# Patient Record
Sex: FEMALE | Race: OTHER | HISPANIC OR LATINO | URBAN - METROPOLITAN AREA
[De-identification: names, ages, dates, MRNs, and addresses within clinical notes are randomized per-mention and may not be internally consistent; named-entity substitution may affect disease eponyms.]

---

## 2019-02-09 ENCOUNTER — INPATIENT (INPATIENT)
Facility: HOSPITAL | Age: 34
LOS: 3 days | Discharge: HOME | End: 2019-02-13
Attending: OBSTETRICS & GYNECOLOGY | Admitting: OBSTETRICS & GYNECOLOGY
Payer: COMMERCIAL

## 2019-02-09 VITALS — WEIGHT: 177.91 LBS | HEIGHT: 60 IN

## 2019-02-09 DIAGNOSIS — O26.899 OTHER SPECIFIED PREGNANCY RELATED CONDITIONS, UNSPECIFIED TRIMESTER: ICD-10-CM

## 2019-02-09 DIAGNOSIS — Z3A.00 WEEKS OF GESTATION OF PREGNANCY NOT SPECIFIED: ICD-10-CM

## 2019-02-09 LAB
ALBUMIN SERPL ELPH-MCNC: 3.6 G/DL — SIGNIFICANT CHANGE UP (ref 3.3–5)
ALP SERPL-CCNC: 119 U/L — SIGNIFICANT CHANGE UP (ref 40–120)
ALT FLD-CCNC: 16 U/L — SIGNIFICANT CHANGE UP (ref 10–45)
ANION GAP SERPL CALC-SCNC: 14 MMOL/L — SIGNIFICANT CHANGE UP (ref 5–17)
APPEARANCE UR: CLEAR — SIGNIFICANT CHANGE UP
APTT BLD: 27.9 SEC — SIGNIFICANT CHANGE UP (ref 27.5–36.3)
AST SERPL-CCNC: 20 U/L — SIGNIFICANT CHANGE UP (ref 10–40)
BILIRUB DIRECT SERPL-MCNC: <0.2 MG/DL — SIGNIFICANT CHANGE UP (ref 0–0.2)
BILIRUB INDIRECT FLD-MCNC: >0 MG/DL — LOW (ref 0.2–1)
BILIRUB SERPL-MCNC: 0.2 MG/DL — SIGNIFICANT CHANGE UP (ref 0.2–1.2)
BILIRUB UR-MCNC: NEGATIVE — SIGNIFICANT CHANGE UP
BUN SERPL-MCNC: 4 MG/DL — LOW (ref 7–23)
CALCIUM SERPL-MCNC: 9.6 MG/DL — SIGNIFICANT CHANGE UP (ref 8.4–10.5)
CHLORIDE SERPL-SCNC: 102 MMOL/L — SIGNIFICANT CHANGE UP (ref 96–108)
CO2 SERPL-SCNC: 21 MMOL/L — LOW (ref 22–31)
COLOR SPEC: YELLOW — SIGNIFICANT CHANGE UP
CREAT SERPL-MCNC: 0.4 MG/DL — LOW (ref 0.5–1.3)
DIFF PNL FLD: ABNORMAL
FIBRINOGEN PPP-MCNC: 563 MG/DL — HIGH (ref 258–438)
FIBRONECTIN FETAL SPEC QL: POSITIVE
GLUCOSE SERPL-MCNC: 71 MG/DL — SIGNIFICANT CHANGE UP (ref 70–99)
GLUCOSE UR QL: NEGATIVE — SIGNIFICANT CHANGE UP
HBV SURFACE AG SER-ACNC: SIGNIFICANT CHANGE UP
HCT VFR BLD CALC: 38.1 % — SIGNIFICANT CHANGE UP (ref 34.5–45)
HGB BLD-MCNC: 12.2 G/DL — SIGNIFICANT CHANGE UP (ref 11.5–15.5)
HIV 1+2 AB+HIV1 P24 AG SERPL QL IA: SIGNIFICANT CHANGE UP
INR BLD: 0.98 — SIGNIFICANT CHANGE UP (ref 0.88–1.16)
KETONES UR-MCNC: NEGATIVE — SIGNIFICANT CHANGE UP
LEUKOCYTE ESTERASE UR-ACNC: NEGATIVE — SIGNIFICANT CHANGE UP
MCHC RBC-ENTMCNC: 29.4 PG — SIGNIFICANT CHANGE UP (ref 27–34)
MCHC RBC-ENTMCNC: 32 GM/DL — SIGNIFICANT CHANGE UP (ref 32–36)
MCV RBC AUTO: 91.8 FL — SIGNIFICANT CHANGE UP (ref 80–100)
NITRITE UR-MCNC: NEGATIVE — SIGNIFICANT CHANGE UP
NRBC # BLD: 0 /100 WBCS — SIGNIFICANT CHANGE UP (ref 0–0)
PH UR: 7 — SIGNIFICANT CHANGE UP (ref 5–8)
PLATELET # BLD AUTO: 352 K/UL — SIGNIFICANT CHANGE UP (ref 150–400)
POTASSIUM SERPL-MCNC: 3.4 MMOL/L — LOW (ref 3.5–5.3)
POTASSIUM SERPL-SCNC: 3.4 MMOL/L — LOW (ref 3.5–5.3)
PROT SERPL-MCNC: 7.2 G/DL — SIGNIFICANT CHANGE UP (ref 6–8.3)
PROT UR-MCNC: NEGATIVE MG/DL — SIGNIFICANT CHANGE UP
PROTHROM AB SERPL-ACNC: 11.1 SEC — SIGNIFICANT CHANGE UP (ref 10–12.9)
RBC # BLD: 4.15 M/UL — SIGNIFICANT CHANGE UP (ref 3.8–5.2)
RBC # FLD: 13.6 % — SIGNIFICANT CHANGE UP (ref 10.3–14.5)
SODIUM SERPL-SCNC: 137 MMOL/L — SIGNIFICANT CHANGE UP (ref 135–145)
SP GR SPEC: <=1.005 — SIGNIFICANT CHANGE UP (ref 1–1.03)
UROBILINOGEN FLD QL: 0.2 E.U./DL — SIGNIFICANT CHANGE UP
WBC # BLD: 18.87 K/UL — HIGH (ref 3.8–10.5)
WBC # FLD AUTO: 18.87 K/UL — HIGH (ref 3.8–10.5)

## 2019-02-09 RX ORDER — PENICILLIN G POTASSIUM 5000000 [IU]/1
POWDER, FOR SOLUTION INTRAMUSCULAR; INTRAPLEURAL; INTRATHECAL; INTRAVENOUS
Qty: 0 | Refills: 0 | Status: DISCONTINUED | OUTPATIENT
Start: 2019-02-09 | End: 2019-02-10

## 2019-02-09 RX ORDER — MORPHINE SULFATE 50 MG/1
2 CAPSULE, EXTENDED RELEASE ORAL ONCE
Qty: 0 | Refills: 0 | Status: DISCONTINUED | OUTPATIENT
Start: 2019-02-09 | End: 2019-02-09

## 2019-02-09 RX ORDER — SODIUM CHLORIDE 9 MG/ML
1000 INJECTION, SOLUTION INTRAVENOUS
Qty: 0 | Refills: 0 | Status: COMPLETED | OUTPATIENT
Start: 2019-02-09 | End: 2019-02-10

## 2019-02-09 RX ORDER — PENICILLIN G POTASSIUM 5000000 [IU]/1
5 POWDER, FOR SOLUTION INTRAMUSCULAR; INTRAPLEURAL; INTRATHECAL; INTRAVENOUS ONCE
Qty: 0 | Refills: 0 | Status: COMPLETED | OUTPATIENT
Start: 2019-02-09 | End: 2019-02-09

## 2019-02-09 RX ORDER — PENICILLIN G POTASSIUM 5000000 [IU]/1
2.5 POWDER, FOR SOLUTION INTRAMUSCULAR; INTRAPLEURAL; INTRATHECAL; INTRAVENOUS EVERY 4 HOURS
Qty: 0 | Refills: 0 | Status: DISCONTINUED | OUTPATIENT
Start: 2019-02-09 | End: 2019-02-10

## 2019-02-09 RX ORDER — NIFEDIPINE 30 MG
20 TABLET, EXTENDED RELEASE 24 HR ORAL EVERY 4 HOURS
Qty: 0 | Refills: 0 | Status: COMPLETED | OUTPATIENT
Start: 2019-02-09 | End: 2019-02-10

## 2019-02-09 RX ADMIN — SODIUM CHLORIDE 125 MILLILITER(S): 9 INJECTION, SOLUTION INTRAVENOUS at 15:15

## 2019-02-09 RX ADMIN — Medication 20 MILLIGRAM(S): at 21:56

## 2019-02-09 RX ADMIN — Medication 20 MILLIGRAM(S): at 17:41

## 2019-02-09 RX ADMIN — SODIUM CHLORIDE 125 MILLILITER(S): 9 INJECTION, SOLUTION INTRAVENOUS at 19:10

## 2019-02-09 RX ADMIN — Medication 12 MILLIGRAM(S): at 15:40

## 2019-02-09 RX ADMIN — PENICILLIN G POTASSIUM 100 MILLION UNIT(S): 5000000 POWDER, FOR SOLUTION INTRAMUSCULAR; INTRAPLEURAL; INTRATHECAL; INTRAVENOUS at 23:17

## 2019-02-09 RX ADMIN — MORPHINE SULFATE 2 MILLIGRAM(S): 50 CAPSULE, EXTENDED RELEASE ORAL at 18:26

## 2019-02-09 RX ADMIN — MORPHINE SULFATE 2 MILLIGRAM(S): 50 CAPSULE, EXTENDED RELEASE ORAL at 18:45

## 2019-02-09 RX ADMIN — PENICILLIN G POTASSIUM 100 MILLION UNIT(S): 5000000 POWDER, FOR SOLUTION INTRAMUSCULAR; INTRAPLEURAL; INTRATHECAL; INTRAVENOUS at 19:10

## 2019-02-09 RX ADMIN — SODIUM CHLORIDE 125 MILLILITER(S): 9 INJECTION, SOLUTION INTRAVENOUS at 18:00

## 2019-02-10 LAB
HCT VFR BLD CALC: 32.8 % — LOW (ref 34.5–45)
HGB BLD-MCNC: 11.1 G/DL — LOW (ref 11.5–15.5)
MCHC RBC-ENTMCNC: 30.6 PG — SIGNIFICANT CHANGE UP (ref 27–34)
MCHC RBC-ENTMCNC: 33.8 GM/DL — SIGNIFICANT CHANGE UP (ref 32–36)
MCV RBC AUTO: 90.4 FL — SIGNIFICANT CHANGE UP (ref 80–100)
NRBC # BLD: 0 /100 WBCS — SIGNIFICANT CHANGE UP (ref 0–0)
PLATELET # BLD AUTO: 328 K/UL — SIGNIFICANT CHANGE UP (ref 150–400)
RBC # BLD: 3.63 M/UL — LOW (ref 3.8–5.2)
RBC # FLD: 13.5 % — SIGNIFICANT CHANGE UP (ref 10.3–14.5)
WBC # BLD: 28.67 K/UL — HIGH (ref 3.8–10.5)
WBC # FLD AUTO: 28.67 K/UL — HIGH (ref 3.8–10.5)

## 2019-02-10 PROCEDURE — 93010 ELECTROCARDIOGRAM REPORT: CPT

## 2019-02-10 RX ORDER — MORPHINE SULFATE 50 MG/1
4 CAPSULE, EXTENDED RELEASE ORAL ONCE
Qty: 0 | Refills: 0 | Status: DISCONTINUED | OUTPATIENT
Start: 2019-02-10 | End: 2019-02-10

## 2019-02-10 RX ORDER — FENTANYL/BUPIVACAINE/NS/PF 2MCG/ML-.1
250 PLASTIC BAG, INJECTION (ML) INJECTION
Qty: 0 | Refills: 0 | Status: DISCONTINUED | OUTPATIENT
Start: 2019-02-10 | End: 2019-02-11

## 2019-02-10 RX ORDER — MORPHINE SULFATE 50 MG/1
2 CAPSULE, EXTENDED RELEASE ORAL ONCE
Qty: 0 | Refills: 0 | Status: DISCONTINUED | OUTPATIENT
Start: 2019-02-10 | End: 2019-02-10

## 2019-02-10 RX ORDER — AZITHROMYCIN 500 MG/1
1000 TABLET, FILM COATED ORAL ONCE
Qty: 0 | Refills: 0 | Status: COMPLETED | OUTPATIENT
Start: 2019-02-10 | End: 2019-02-10

## 2019-02-10 RX ORDER — AMPICILLIN TRIHYDRATE 250 MG
2 CAPSULE ORAL EVERY 6 HOURS
Qty: 0 | Refills: 0 | Status: COMPLETED | OUTPATIENT
Start: 2019-02-10 | End: 2019-02-11

## 2019-02-10 RX ORDER — ACETAMINOPHEN 500 MG
650 TABLET ORAL ONCE
Qty: 0 | Refills: 0 | Status: COMPLETED | OUTPATIENT
Start: 2019-02-10 | End: 2019-02-10

## 2019-02-10 RX ORDER — DIPHENHYDRAMINE HCL 50 MG
50 CAPSULE ORAL ONCE
Qty: 0 | Refills: 0 | Status: COMPLETED | OUTPATIENT
Start: 2019-02-10 | End: 2019-02-10

## 2019-02-10 RX ORDER — FENTANYL/BUPIVACAINE/NS/PF 2MCG/ML-.1
250 PLASTIC BAG, INJECTION (ML) INJECTION
Qty: 0 | Refills: 0 | Status: DISCONTINUED | OUTPATIENT
Start: 2019-02-10 | End: 2019-02-10

## 2019-02-10 RX ADMIN — Medication 20 MILLIGRAM(S): at 13:35

## 2019-02-10 RX ADMIN — Medication 650 MILLIGRAM(S): at 06:46

## 2019-02-10 RX ADMIN — Medication 20 MILLIGRAM(S): at 09:42

## 2019-02-10 RX ADMIN — Medication 20 MILLIGRAM(S): at 01:49

## 2019-02-10 RX ADMIN — Medication 216 GRAM(S): at 14:49

## 2019-02-10 RX ADMIN — Medication 12 MILLIGRAM(S): at 10:14

## 2019-02-10 RX ADMIN — Medication 216 GRAM(S): at 08:52

## 2019-02-10 RX ADMIN — Medication 216 GRAM(S): at 21:15

## 2019-02-10 RX ADMIN — Medication 650 MILLIGRAM(S): at 06:07

## 2019-02-10 RX ADMIN — MORPHINE SULFATE 4 MILLIGRAM(S): 50 CAPSULE, EXTENDED RELEASE ORAL at 17:21

## 2019-02-10 RX ADMIN — Medication 216 GRAM(S): at 03:12

## 2019-02-10 RX ADMIN — AZITHROMYCIN 1000 MILLIGRAM(S): 500 TABLET, FILM COATED ORAL at 02:42

## 2019-02-10 RX ADMIN — MORPHINE SULFATE 2 MILLIGRAM(S): 50 CAPSULE, EXTENDED RELEASE ORAL at 17:10

## 2019-02-10 RX ADMIN — Medication 20 MILLIGRAM(S): at 05:45

## 2019-02-10 RX ADMIN — Medication 50 MILLIGRAM(S): at 21:14

## 2019-02-11 LAB
CULTURE RESULTS: SIGNIFICANT CHANGE UP
RUBV IGG SER-ACNC: 2.6 INDEX — SIGNIFICANT CHANGE UP
RUBV IGG SER-IMP: POSITIVE — SIGNIFICANT CHANGE UP
SPECIMEN SOURCE: SIGNIFICANT CHANGE UP
T PALLIDUM AB TITR SER: NEGATIVE — SIGNIFICANT CHANGE UP

## 2019-02-11 RX ORDER — ACETAMINOPHEN 500 MG
1000 TABLET ORAL ONCE
Refills: 0 | Status: DISCONTINUED | OUTPATIENT
Start: 2019-02-11 | End: 2019-02-11

## 2019-02-11 RX ORDER — OXYCODONE AND ACETAMINOPHEN 5; 325 MG/1; MG/1
1 TABLET ORAL
Qty: 0 | Refills: 0 | Status: DISCONTINUED | OUTPATIENT
Start: 2019-02-11 | End: 2019-02-13

## 2019-02-11 RX ORDER — HYDROCORTISONE 1 %
1 OINTMENT (GRAM) TOPICAL EVERY 4 HOURS
Qty: 0 | Refills: 0 | Status: DISCONTINUED | OUTPATIENT
Start: 2019-02-11 | End: 2019-02-13

## 2019-02-11 RX ORDER — OXYTOCIN 10 UNIT/ML
41.67 VIAL (ML) INJECTION
Qty: 20 | Refills: 0 | Status: DISCONTINUED | OUTPATIENT
Start: 2019-02-11 | End: 2019-02-13

## 2019-02-11 RX ORDER — AMOXICILLIN 250 MG/5ML
500 SUSPENSION, RECONSTITUTED, ORAL (ML) ORAL THREE TIMES A DAY
Qty: 0 | Refills: 0 | Status: DISCONTINUED | OUTPATIENT
Start: 2019-02-11 | End: 2019-02-11

## 2019-02-11 RX ORDER — AER TRAVELER 0.5 G/1
1 SOLUTION RECTAL; TOPICAL EVERY 4 HOURS
Qty: 0 | Refills: 0 | Status: DISCONTINUED | OUTPATIENT
Start: 2019-02-11 | End: 2019-02-13

## 2019-02-11 RX ORDER — ACETAMINOPHEN 500 MG
1000 TABLET ORAL ONCE
Qty: 0 | Refills: 0 | Status: COMPLETED | OUTPATIENT
Start: 2019-02-11 | End: 2019-02-11

## 2019-02-11 RX ORDER — MAGNESIUM HYDROXIDE 400 MG/1
30 TABLET, CHEWABLE ORAL
Qty: 0 | Refills: 0 | Status: DISCONTINUED | OUTPATIENT
Start: 2019-02-11 | End: 2019-02-13

## 2019-02-11 RX ORDER — GLYCERIN ADULT
1 SUPPOSITORY, RECTAL RECTAL AT BEDTIME
Qty: 0 | Refills: 0 | Status: DISCONTINUED | OUTPATIENT
Start: 2019-02-11 | End: 2019-02-13

## 2019-02-11 RX ORDER — ACETAMINOPHEN 500 MG
650 TABLET ORAL EVERY 6 HOURS
Qty: 0 | Refills: 0 | Status: DISCONTINUED | OUTPATIENT
Start: 2019-02-11 | End: 2019-02-13

## 2019-02-11 RX ORDER — SODIUM CHLORIDE 9 MG/ML
3 INJECTION INTRAMUSCULAR; INTRAVENOUS; SUBCUTANEOUS EVERY 8 HOURS
Qty: 0 | Refills: 0 | Status: DISCONTINUED | OUTPATIENT
Start: 2019-02-11 | End: 2019-02-13

## 2019-02-11 RX ORDER — DOCUSATE SODIUM 100 MG
100 CAPSULE ORAL
Qty: 0 | Refills: 0 | Status: DISCONTINUED | OUTPATIENT
Start: 2019-02-11 | End: 2019-02-13

## 2019-02-11 RX ORDER — DIBUCAINE 1 %
1 OINTMENT (GRAM) RECTAL EVERY 4 HOURS
Qty: 0 | Refills: 0 | Status: DISCONTINUED | OUTPATIENT
Start: 2019-02-11 | End: 2019-02-13

## 2019-02-11 RX ORDER — LANOLIN
1 OINTMENT (GRAM) TOPICAL EVERY 6 HOURS
Qty: 0 | Refills: 0 | Status: DISCONTINUED | OUTPATIENT
Start: 2019-02-11 | End: 2019-02-13

## 2019-02-11 RX ORDER — SIMETHICONE 80 MG/1
80 TABLET, CHEWABLE ORAL EVERY 6 HOURS
Qty: 0 | Refills: 0 | Status: DISCONTINUED | OUTPATIENT
Start: 2019-02-11 | End: 2019-02-13

## 2019-02-11 RX ORDER — MORPHINE SULFATE 50 MG/1
4 CAPSULE, EXTENDED RELEASE ORAL ONCE
Qty: 0 | Refills: 0 | Status: DISCONTINUED | OUTPATIENT
Start: 2019-02-11 | End: 2019-02-11

## 2019-02-11 RX ORDER — TETANUS TOXOID, REDUCED DIPHTHERIA TOXOID AND ACELLULAR PERTUSSIS VACCINE, ADSORBED 5; 2.5; 8; 8; 2.5 [IU]/.5ML; [IU]/.5ML; UG/.5ML; UG/.5ML; UG/.5ML
0.5 SUSPENSION INTRAMUSCULAR ONCE
Qty: 0 | Refills: 0 | Status: DISCONTINUED | OUTPATIENT
Start: 2019-02-11 | End: 2019-02-13

## 2019-02-11 RX ORDER — DIPHENHYDRAMINE HCL 50 MG
25 CAPSULE ORAL EVERY 6 HOURS
Qty: 0 | Refills: 0 | Status: DISCONTINUED | OUTPATIENT
Start: 2019-02-11 | End: 2019-02-13

## 2019-02-11 RX ORDER — IBUPROFEN 200 MG
600 TABLET ORAL EVERY 6 HOURS
Qty: 0 | Refills: 0 | Status: DISCONTINUED | OUTPATIENT
Start: 2019-02-11 | End: 2019-02-13

## 2019-02-11 RX ORDER — MORPHINE SULFATE 50 MG/1
4 CAPSULE, EXTENDED RELEASE ORAL ONCE
Refills: 0 | Status: DISCONTINUED | OUTPATIENT
Start: 2019-02-11 | End: 2019-02-11

## 2019-02-11 RX ORDER — PRAMOXINE HYDROCHLORIDE 150 MG/15G
1 AEROSOL, FOAM RECTAL EVERY 4 HOURS
Qty: 0 | Refills: 0 | Status: DISCONTINUED | OUTPATIENT
Start: 2019-02-11 | End: 2019-02-13

## 2019-02-11 RX ADMIN — Medication 125 MILLIUNIT(S)/MIN: at 19:20

## 2019-02-11 RX ADMIN — SODIUM CHLORIDE 3 MILLILITER(S): 9 INJECTION INTRAMUSCULAR; INTRAVENOUS; SUBCUTANEOUS at 22:30

## 2019-02-11 RX ADMIN — Medication 1 TABLET(S): at 12:05

## 2019-02-11 RX ADMIN — Medication 100 MILLIGRAM(S): at 22:30

## 2019-02-11 RX ADMIN — Medication 500 MILLIGRAM(S): at 15:18

## 2019-02-11 RX ADMIN — Medication 600 MILLIGRAM(S): at 22:54

## 2019-02-11 RX ADMIN — Medication 1 APPLICATION(S): at 22:31

## 2019-02-11 RX ADMIN — Medication 216 GRAM(S): at 09:04

## 2019-02-11 RX ADMIN — SODIUM CHLORIDE 125 MILLILITER(S): 9 INJECTION, SOLUTION INTRAVENOUS at 06:12

## 2019-02-11 RX ADMIN — Medication 216 GRAM(S): at 02:59

## 2019-02-11 RX ADMIN — Medication 600 MILLIGRAM(S): at 22:31

## 2019-02-11 RX ADMIN — Medication 400 MILLIGRAM(S): at 19:30

## 2019-02-11 RX ADMIN — MORPHINE SULFATE 4 MILLIGRAM(S): 50 CAPSULE, EXTENDED RELEASE ORAL at 19:12

## 2019-02-12 LAB
HCT VFR BLD CALC: 30.7 % — LOW (ref 34.5–45)
HGB BLD-MCNC: 10.1 G/DL — LOW (ref 11.5–15.5)
MCHC RBC-ENTMCNC: 30.4 PG — SIGNIFICANT CHANGE UP (ref 27–34)
MCHC RBC-ENTMCNC: 32.9 GM/DL — SIGNIFICANT CHANGE UP (ref 32–36)
MCV RBC AUTO: 92.5 FL — SIGNIFICANT CHANGE UP (ref 80–100)
NRBC # BLD: 0 /100 WBCS — SIGNIFICANT CHANGE UP (ref 0–0)
PLATELET # BLD AUTO: 345 K/UL — SIGNIFICANT CHANGE UP (ref 150–400)
RBC # BLD: 3.32 M/UL — LOW (ref 3.8–5.2)
RBC # FLD: 13.8 % — SIGNIFICANT CHANGE UP (ref 10.3–14.5)
WBC # BLD: 19.24 K/UL — HIGH (ref 3.8–10.5)
WBC # FLD AUTO: 19.24 K/UL — HIGH (ref 3.8–10.5)

## 2019-02-12 RX ADMIN — SODIUM CHLORIDE 3 MILLILITER(S): 9 INJECTION INTRAMUSCULAR; INTRAVENOUS; SUBCUTANEOUS at 13:27

## 2019-02-12 RX ADMIN — Medication 600 MILLIGRAM(S): at 20:03

## 2019-02-12 RX ADMIN — OXYCODONE AND ACETAMINOPHEN 1 TABLET(S): 5; 325 TABLET ORAL at 01:47

## 2019-02-12 RX ADMIN — SODIUM CHLORIDE 3 MILLILITER(S): 9 INJECTION INTRAMUSCULAR; INTRAVENOUS; SUBCUTANEOUS at 06:27

## 2019-02-12 RX ADMIN — Medication 600 MILLIGRAM(S): at 14:20

## 2019-02-12 RX ADMIN — Medication 600 MILLIGRAM(S): at 08:43

## 2019-02-12 RX ADMIN — Medication 600 MILLIGRAM(S): at 07:59

## 2019-02-12 RX ADMIN — Medication 600 MILLIGRAM(S): at 13:26

## 2019-02-12 RX ADMIN — Medication 600 MILLIGRAM(S): at 19:32

## 2019-02-12 RX ADMIN — OXYCODONE AND ACETAMINOPHEN 1 TABLET(S): 5; 325 TABLET ORAL at 02:47

## 2019-02-12 RX ADMIN — Medication 100 MILLIGRAM(S): at 13:25

## 2019-02-12 RX ADMIN — Medication 1 TABLET(S): at 13:25

## 2019-02-12 NOTE — PROGRESS NOTE ADULT - SUBJECTIVE AND OBJECTIVE BOX
Patient seen and examined, feels well.  Ambulating, tolerating PO, voiding without difficulty   Pain managed  Denies fevers/chills/CP or SOB    avss, afebrile  Fundus NT  No LE edema    PPD1 s/p  after PTL/PPROM at 32.4  recovering well  ambulation, PO, pain management  pumping encouraged  DC planning PPD2  all questions answered

## 2019-02-12 NOTE — LACTATION INITIAL EVALUATION - INFANT FEEDING PLAN COMMENT, OB PROFILE
Mom is encouraged to pump 8 or more times a day  to enhance the  onset of milk and secure a good milk supply. A hospital grade pump has been provided with instructions on use. Milk storage and collection also reviewed, guidelines provided. The NICU staff will provide preprinted labels for the EBM. Emotional support provided. Availability of a pump at home will be ascertained.

## 2019-02-13 VITALS
SYSTOLIC BLOOD PRESSURE: 100 MMHG | TEMPERATURE: 98 F | OXYGEN SATURATION: 97 % | DIASTOLIC BLOOD PRESSURE: 64 MMHG | HEART RATE: 81 BPM | RESPIRATION RATE: 18 BRPM

## 2019-02-13 LAB — SURGICAL PATHOLOGY STUDY: SIGNIFICANT CHANGE UP

## 2019-02-13 PROCEDURE — 87389 HIV-1 AG W/HIV-1&-2 AB AG IA: CPT

## 2019-02-13 PROCEDURE — 87340 HEPATITIS B SURFACE AG IA: CPT

## 2019-02-13 PROCEDURE — 86780 TREPONEMA PALLIDUM: CPT

## 2019-02-13 PROCEDURE — 87081 CULTURE SCREEN ONLY: CPT

## 2019-02-13 PROCEDURE — 93005 ELECTROCARDIOGRAM TRACING: CPT

## 2019-02-13 PROCEDURE — 82731 ASSAY OF FETAL FIBRONECTIN: CPT

## 2019-02-13 PROCEDURE — 99214 OFFICE O/P EST MOD 30 MIN: CPT

## 2019-02-13 PROCEDURE — 85384 FIBRINOGEN ACTIVITY: CPT

## 2019-02-13 PROCEDURE — 88307 TISSUE EXAM BY PATHOLOGIST: CPT

## 2019-02-13 PROCEDURE — 86900 BLOOD TYPING SEROLOGIC ABO: CPT

## 2019-02-13 PROCEDURE — 85027 COMPLETE CBC AUTOMATED: CPT

## 2019-02-13 PROCEDURE — 36415 COLL VENOUS BLD VENIPUNCTURE: CPT

## 2019-02-13 PROCEDURE — 86901 BLOOD TYPING SEROLOGIC RH(D): CPT

## 2019-02-13 PROCEDURE — 85610 PROTHROMBIN TIME: CPT

## 2019-02-13 PROCEDURE — 82248 BILIRUBIN DIRECT: CPT

## 2019-02-13 PROCEDURE — 86762 RUBELLA ANTIBODY: CPT

## 2019-02-13 PROCEDURE — 80053 COMPREHEN METABOLIC PANEL: CPT

## 2019-02-13 PROCEDURE — 86850 RBC ANTIBODY SCREEN: CPT

## 2019-02-13 PROCEDURE — 85730 THROMBOPLASTIN TIME PARTIAL: CPT

## 2019-02-13 PROCEDURE — 81001 URINALYSIS AUTO W/SCOPE: CPT

## 2019-02-13 RX ORDER — DOCUSATE SODIUM 100 MG
1 CAPSULE ORAL
Qty: 0 | Refills: 0 | COMMUNITY
Start: 2019-02-13

## 2019-02-13 RX ORDER — LANOLIN
1 OINTMENT (GRAM) TOPICAL
Qty: 0 | Refills: 0 | COMMUNITY
Start: 2019-02-13

## 2019-02-13 RX ORDER — SIMETHICONE 80 MG/1
1 TABLET, CHEWABLE ORAL
Qty: 0 | Refills: 0 | COMMUNITY
Start: 2019-02-13

## 2019-02-13 RX ORDER — IBUPROFEN 200 MG
1 TABLET ORAL
Qty: 0 | Refills: 0 | COMMUNITY
Start: 2019-02-13

## 2019-02-13 RX ORDER — ACETAMINOPHEN 500 MG
2 TABLET ORAL
Qty: 0 | Refills: 0 | COMMUNITY
Start: 2019-02-13

## 2019-02-13 RX ADMIN — Medication 600 MILLIGRAM(S): at 05:35

## 2019-02-13 RX ADMIN — Medication 100 MILLIGRAM(S): at 11:58

## 2019-02-13 RX ADMIN — Medication 600 MILLIGRAM(S): at 04:36

## 2019-02-13 RX ADMIN — Medication 600 MILLIGRAM(S): at 12:30

## 2019-02-13 RX ADMIN — Medication 600 MILLIGRAM(S): at 11:58

## 2019-02-13 RX ADMIN — Medication 1 TABLET(S): at 11:58

## 2019-02-13 RX ADMIN — Medication 100 MILLIGRAM(S): at 04:36

## 2019-02-13 NOTE — DISCHARGE NOTE OB - CARE PROVIDER_API CALL
Leobardo Chi)  Obstetrics and Gynecology  [address], [address]  [place], [place] [address]  Phone: [phone]  Fax: [phone]  Follow Up Time:

## 2019-02-13 NOTE — DISCHARGE NOTE OB - PATIENT PORTAL LINK FT
You can access the [facility] Patient Portal, offered by [facility], by registering with the following website: [email or web address]

## 2019-02-13 NOTE — DISCHARGE NOTE OB - CARE PLAN
Principal Discharge DX:	Postpartum state  Goal:	Feel well!  Assessment and plan of treatment:	Vaginal delivery, meeting all postpartum milestones.  Follow up in 6 weeks.

## 2019-02-13 NOTE — DISCHARGE NOTE OB - MEDICATION SUMMARY - MEDICATIONS TO TAKE
I will START or STAY ON the medications listed below when I get home from the hospital:    acetaminophen 325 mg oral tablet  -- 2 tab(s) by mouth every 6 hours, As needed, Temp greater or equal to 38.5C (101.3F), Mild Pain (1 - 3)  -- Indication: For Pain    ibuprofen 600 mg oral tablet  -- 1 tab(s) by mouth every 6 hours, As needed, Moderate Pain (4 - 6)  -- Indication: For Pain    lanolin topical ointment  -- 1 application on skin every 6 hours, As needed, Sore Nipples  -- Indication: For Breastfeeding    docusate sodium 100 mg oral capsule  -- 1 cap(s) by mouth 2 times a day, As needed, Stool Softening  -- Indication: For Constipation    simethicone 80 mg oral tablet, chewable  -- 1 tab(s) by mouth every 6 hours, As needed, Gas  -- Indication: For Gas

## 2019-02-15 DIAGNOSIS — Z3A.32 32 WEEKS GESTATION OF PREGNANCY: ICD-10-CM

## 2019-02-15 DIAGNOSIS — Z87.59 PERSONAL HISTORY OF OTHER COMPLICATIONS OF PREGNANCY, CHILDBIRTH AND THE PUERPERIUM: ICD-10-CM

## 2019-02-15 DIAGNOSIS — Z90.49 ACQUIRED ABSENCE OF OTHER SPECIFIED PARTS OF DIGESTIVE TRACT: ICD-10-CM

## 2019-02-15 DIAGNOSIS — Z34.83 ENCOUNTER FOR SUPERVISION OF OTHER NORMAL PREGNANCY, THIRD TRIMESTER: ICD-10-CM

## 2019-02-15 DIAGNOSIS — Z91.013 ALLERGY TO SEAFOOD: ICD-10-CM

## 2019-02-15 DIAGNOSIS — O34.211 MATERNAL CARE FOR LOW TRANSVERSE SCAR FROM PREVIOUS CESAREAN DELIVERY: ICD-10-CM

## 2019-02-15 DIAGNOSIS — J45.909 UNSPECIFIED ASTHMA, UNCOMPLICATED: ICD-10-CM

## 2019-02-15 DIAGNOSIS — Z88.0 ALLERGY STATUS TO PENICILLIN: ICD-10-CM

## 2019-03-11 PROBLEM — Z00.00 ENCOUNTER FOR PREVENTIVE HEALTH EXAMINATION: Status: ACTIVE | Noted: 2019-03-11

## 2020-03-06 NOTE — DISCHARGE NOTE OB - PERSISTENT HEADACHE, BLURRED VISION
Writer called patient and explained that Bhargavi STEINER would like to discuss the form in depth before signing.     Form is on my desk upper left side.   Statement Selected

## 2023-11-02 NOTE — PATIENT PROFILE OB - SURGICAL SITE INCISION
BMI:   HbA1c: A1C with Estimated Average Glucose Result: 4.9 % (10-18-23 @ 11:45)    Glucose:   BP: 116/74 (11-01-23 @ 10:00) (109/70 - 116/74)  Lipid Panel: Date/Time: 10-18-23 @ 11:45  Cholesterol, Serum: 123  Direct LDL: --  HDL Cholesterol, Serum: 49  Total Cholesterol/HDL Ration Measurement: --  Triglycerides, Serum: 153   no

## 2024-03-25 NOTE — PATIENT PROFILE OB - BELONGINGS, PROFILE
HUB TO READ.  Left VM for pt to call the office & schedule an appointment.   earrings/ring/cell phone